# Patient Record
Sex: MALE | Race: WHITE | Employment: UNEMPLOYED | URBAN - METROPOLITAN AREA
[De-identification: names, ages, dates, MRNs, and addresses within clinical notes are randomized per-mention and may not be internally consistent; named-entity substitution may affect disease eponyms.]

---
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
## 2024-05-06 ENCOUNTER — OFFICE VISIT (OUTPATIENT)
Age: 19
End: 2024-05-06
Payer: COMMERCIAL

## 2024-05-06 VITALS
RESPIRATION RATE: 16 BRPM | SYSTOLIC BLOOD PRESSURE: 117 MMHG | TEMPERATURE: 97.9 F | OXYGEN SATURATION: 97 % | HEIGHT: 74 IN | WEIGHT: 194 LBS | HEART RATE: 68 BPM | BODY MASS INDEX: 24.9 KG/M2 | DIASTOLIC BLOOD PRESSURE: 75 MMHG

## 2024-05-06 DIAGNOSIS — R53.83 OTHER FATIGUE: ICD-10-CM

## 2024-05-06 DIAGNOSIS — J39.9 UPPER RESPIRATORY DISEASE: ICD-10-CM

## 2024-05-06 DIAGNOSIS — Z11.3 SCREENING FOR STD (SEXUALLY TRANSMITTED DISEASE): ICD-10-CM

## 2024-05-06 DIAGNOSIS — Z83.49 FAMILY HISTORY OF ALPHA 1 ANTITRYPSIN DEFICIENCY: ICD-10-CM

## 2024-05-06 DIAGNOSIS — J06.9 UPPER RESPIRATORY TRACT INFECTION, UNSPECIFIED TYPE: ICD-10-CM

## 2024-05-06 DIAGNOSIS — Z00.00 ENCOUNTER FOR MEDICAL EXAMINATION TO ESTABLISH CARE: Primary | ICD-10-CM

## 2024-05-06 LAB
ALBUMIN SERPL-MCNC: 4.2 G/DL (ref 3.5–5)
ALBUMIN/GLOB SERPL: 1 (ref 1.1–2.2)
ALP SERPL-CCNC: 47 U/L (ref 45–117)
ALT SERPL-CCNC: 25 U/L (ref 12–78)
ANION GAP SERPL CALC-SCNC: 3 MMOL/L (ref 5–15)
AST SERPL-CCNC: 15 U/L (ref 15–37)
BASOPHILS # BLD: 0 K/UL (ref 0–0.1)
BASOPHILS NFR BLD: 1 % (ref 0–1)
BILIRUB SERPL-MCNC: 0.9 MG/DL (ref 0.2–1)
BUN SERPL-MCNC: 14 MG/DL (ref 6–20)
BUN/CREAT SERPL: 12 (ref 12–20)
CALCIUM SERPL-MCNC: 9.8 MG/DL (ref 8.5–10.1)
CHLORIDE SERPL-SCNC: 105 MMOL/L (ref 97–108)
CHOLEST SERPL-MCNC: 178 MG/DL
CO2 SERPL-SCNC: 30 MMOL/L (ref 21–32)
CREAT SERPL-MCNC: 1.18 MG/DL (ref 0.7–1.3)
DIFFERENTIAL METHOD BLD: ABNORMAL
EOSINOPHIL # BLD: 0.1 K/UL (ref 0–0.4)
EOSINOPHIL NFR BLD: 2 % (ref 0–7)
ERYTHROCYTE [DISTWIDTH] IN BLOOD BY AUTOMATED COUNT: 11.5 % (ref 11.5–14.5)
GLOBULIN SER CALC-MCNC: 4.1 G/DL (ref 2–4)
GLUCOSE SERPL-MCNC: 95 MG/DL (ref 65–100)
HCT VFR BLD AUTO: 43.4 % (ref 36.6–50.3)
HDLC SERPL-MCNC: 35 MG/DL
HDLC SERPL: 5.1 (ref 0–5)
HGB BLD-MCNC: 15.4 G/DL (ref 12.1–17)
HIV 1+2 AB+HIV1 P24 AG SERPL QL IA: NONREACTIVE
HIV 1/2 RESULT COMMENT: NORMAL
IMM GRANULOCYTES # BLD AUTO: 0.1 K/UL (ref 0–0.04)
IMM GRANULOCYTES NFR BLD AUTO: 1 % (ref 0–0.5)
LDLC SERPL CALC-MCNC: 124 MG/DL (ref 0–100)
LYMPHOCYTES # BLD: 1.8 K/UL (ref 0.8–3.5)
LYMPHOCYTES NFR BLD: 33 % (ref 12–49)
MCH RBC QN AUTO: 31.2 PG (ref 26–34)
MCHC RBC AUTO-ENTMCNC: 35.5 G/DL (ref 30–36.5)
MCV RBC AUTO: 88 FL (ref 80–99)
MONOCYTES # BLD: 0.5 K/UL (ref 0–1)
MONOCYTES NFR BLD: 10 % (ref 5–13)
NEUTS SEG # BLD: 2.9 K/UL (ref 1.8–8)
NEUTS SEG NFR BLD: 53 % (ref 32–75)
NRBC # BLD: 0 K/UL (ref 0–0.01)
NRBC BLD-RTO: 0 PER 100 WBC
PLATELET # BLD AUTO: 268 K/UL (ref 150–400)
PMV BLD AUTO: 11.2 FL (ref 8.9–12.9)
POTASSIUM SERPL-SCNC: 4.4 MMOL/L (ref 3.5–5.1)
PROT SERPL-MCNC: 8.3 G/DL (ref 6.4–8.2)
RBC # BLD AUTO: 4.93 M/UL (ref 4.1–5.7)
RPR SER QL: NONREACTIVE
SODIUM SERPL-SCNC: 138 MMOL/L (ref 136–145)
TRIGL SERPL-MCNC: 95 MG/DL
TSH SERPL DL<=0.05 MIU/L-ACNC: 4.4 UIU/ML (ref 0.36–3.74)
VLDLC SERPL CALC-MCNC: 19 MG/DL
WBC # BLD AUTO: 5.4 K/UL (ref 4.1–11.1)

## 2024-05-06 PROCEDURE — 99204 OFFICE O/P NEW MOD 45 MIN: CPT | Performed by: INTERNAL MEDICINE

## 2024-05-06 RX ORDER — FEXOFENADINE HCL 180 MG/1
180 TABLET ORAL DAILY
COMMUNITY

## 2024-05-06 SDOH — ECONOMIC STABILITY: HOUSING INSECURITY
IN THE LAST 12 MONTHS, WAS THERE A TIME WHEN YOU DID NOT HAVE A STEADY PLACE TO SLEEP OR SLEPT IN A SHELTER (INCLUDING NOW)?: NO

## 2024-05-06 SDOH — ECONOMIC STABILITY: FOOD INSECURITY: WITHIN THE PAST 12 MONTHS, THE FOOD YOU BOUGHT JUST DIDN'T LAST AND YOU DIDN'T HAVE MONEY TO GET MORE.: NEVER TRUE

## 2024-05-06 SDOH — ECONOMIC STABILITY: INCOME INSECURITY: HOW HARD IS IT FOR YOU TO PAY FOR THE VERY BASICS LIKE FOOD, HOUSING, MEDICAL CARE, AND HEATING?: NOT HARD AT ALL

## 2024-05-06 SDOH — ECONOMIC STABILITY: FOOD INSECURITY: WITHIN THE PAST 12 MONTHS, YOU WORRIED THAT YOUR FOOD WOULD RUN OUT BEFORE YOU GOT MONEY TO BUY MORE.: NEVER TRUE

## 2024-05-06 ASSESSMENT — ENCOUNTER SYMPTOMS
CONSTIPATION: 0
SHORTNESS OF BREATH: 0
EYE PAIN: 0
ABDOMINAL PAIN: 0
SINUS PAIN: 1
CHEST TIGHTNESS: 0
COUGH: 1
SINUS PRESSURE: 1
DIARRHEA: 0
FACIAL SWELLING: 0
ABDOMINAL DISTENTION: 0
WHEEZING: 0
COLOR CHANGE: 0
APNEA: 0
CHOKING: 0
BACK PAIN: 0
PHOTOPHOBIA: 0
ANAL BLEEDING: 0

## 2024-05-06 ASSESSMENT — PATIENT HEALTH QUESTIONNAIRE - PHQ9
2. FEELING DOWN, DEPRESSED OR HOPELESS: SEVERAL DAYS
SUM OF ALL RESPONSES TO PHQ QUESTIONS 1-9: 1
1. LITTLE INTEREST OR PLEASURE IN DOING THINGS: NOT AT ALL
SUM OF ALL RESPONSES TO PHQ QUESTIONS 1-9: 1
SUM OF ALL RESPONSES TO PHQ9 QUESTIONS 1 & 2: 1

## 2024-05-06 NOTE — PROGRESS NOTES
Jim Cosme is a 18 y.o. male who presents today for New Patient  .      He has a history of There is no problem list on file for this patient.  .    Today patient is here to establish care.   Previous PCP Peds. he is switching because is an adult.  Records are not available for me to review.     Had + for flu, strep and bronchitis in late Jan. Still having pretty severe fatigue.  Going on throughout the day.  Having + brain fog. Still having cough and congestion.  Shortness of breath with exertion. Was playing lacrosse and had to stop due to fatigue.   10 lbs down.   Denies any nausea or vomiting.  Denies any changes in bowel habits or urinary frequency.  Denies that his mental health is deteriorating.  Denies any alcohol or drug use.    Family history of Alpha 1 antitrypsin deficiency: Her aunt does have liver disease from this.  Her mother is also positive phenotypically.     Social: At school at [employer]. First year.  Completed semester.    Was living in a dorm.  Vaping regularly. No EtOH.     Sexually active in the past.  Not currently sexually active.  With women.    Patient has had issues with drugs in the past.  Has had [facility] rehab several times.  Most recently when in high school went to [place].  Reports that he has been clean since then.  Reports that he does have good support.  Believes his mental health is relatively stable.    Family History: reviewed, mother with alpha-1 antitrypsin deficiency.  Aunt had to have liver transplant.    ROS  Review of Systems   Constitutional:  Positive for fatigue. Negative for activity change, appetite change and fever.   HENT:  Positive for congestion, sinus pressure and sinus pain. Negative for dental problem, ear discharge, ear pain and facial swelling.    Eyes:  Negative for photophobia and pain.   Respiratory:  Positive for cough. Negative for apnea, choking, chest tightness, shortness of breath and wheezing.    Cardiovascular:  Negative for chest pain.

## 2024-05-07 RX ORDER — PREDNISONE 20 MG/1
TABLET ORAL
Qty: 18 TABLET | Refills: 0 | Status: SHIPPED | OUTPATIENT
Start: 2024-05-07

## 2024-05-08 LAB
EBV INTERPRETATION: ABNORMAL
EBV NA IGG SER-ACNC: 102 U/ML (ref 0–17.9)
EBV VCA IGG SER-ACNC: 68.5 U/ML (ref 0–17.9)
EBV VCA IGM SER-ACNC: <36 U/ML (ref 0–35.9)

## 2024-05-14 LAB
A1AT PHENOTYP SERPL IFE: NORMAL
A1AT SERPL-MCNC: 115 MG/DL (ref 95–164)

## 2024-05-19 DIAGNOSIS — R51.9 NEW ONSET HEADACHE: Primary | ICD-10-CM

## 2024-05-19 DIAGNOSIS — R53.83 OTHER FATIGUE: ICD-10-CM

## 2024-05-19 DIAGNOSIS — R41.0 CONFUSION: ICD-10-CM

## 2024-05-29 ENCOUNTER — HOSPITAL ENCOUNTER (OUTPATIENT)
Facility: HOSPITAL | Age: 19
Discharge: HOME | End: 2024-06-01
Attending: INTERNAL MEDICINE
Payer: COMMERCIAL

## 2024-05-29 DIAGNOSIS — R41.0 CONFUSION: ICD-10-CM

## 2024-05-29 DIAGNOSIS — R53.83 OTHER FATIGUE: ICD-10-CM

## 2024-05-29 DIAGNOSIS — R51.9 NEW ONSET HEADACHE: ICD-10-CM

## 2024-05-29 PROCEDURE — 70450 CT HEAD/BRAIN W/O DYE: CPT

## 2024-06-13 ENCOUNTER — OFFICE VISIT (OUTPATIENT)
Age: 19
End: 2024-06-13
Payer: COMMERCIAL

## 2024-06-13 VITALS
HEIGHT: 74 IN | DIASTOLIC BLOOD PRESSURE: 73 MMHG | HEART RATE: 73 BPM | TEMPERATURE: 97.9 F | RESPIRATION RATE: 16 BRPM | BODY MASS INDEX: 24.64 KG/M2 | OXYGEN SATURATION: 97 % | SYSTOLIC BLOOD PRESSURE: 113 MMHG | WEIGHT: 192 LBS

## 2024-06-13 DIAGNOSIS — R06.09 DOE (DYSPNEA ON EXERTION): ICD-10-CM

## 2024-06-13 DIAGNOSIS — R53.83 OTHER FATIGUE: ICD-10-CM

## 2024-06-13 DIAGNOSIS — J32.9 SINUSITIS, UNSPECIFIED CHRONICITY, UNSPECIFIED LOCATION: Primary | ICD-10-CM

## 2024-06-13 DIAGNOSIS — R07.9 CHEST PAIN, UNSPECIFIED TYPE: ICD-10-CM

## 2024-06-13 DIAGNOSIS — R05.9 COUGH, UNSPECIFIED TYPE: ICD-10-CM

## 2024-06-13 PROCEDURE — 99214 OFFICE O/P EST MOD 30 MIN: CPT | Performed by: INTERNAL MEDICINE

## 2024-06-13 RX ORDER — DOXYCYCLINE HYCLATE 100 MG
100 TABLET ORAL 2 TIMES DAILY
Qty: 14 TABLET | Refills: 0 | Status: SHIPPED | OUTPATIENT
Start: 2024-06-13 | End: 2024-06-20

## 2024-06-13 ASSESSMENT — ENCOUNTER SYMPTOMS
BACK PAIN: 0
RHINORRHEA: 0
SHORTNESS OF BREATH: 1
SINUS PRESSURE: 0
STRIDOR: 0
WHEEZING: 0
ABDOMINAL PAIN: 0
DIARRHEA: 0
COUGH: 1
FACIAL SWELLING: 0
CONSTIPATION: 0
SINUS PAIN: 0

## 2024-06-13 NOTE — PROGRESS NOTES
fexofenadine (ALLEGRA) 180 MG tablet Take 1 tablet by mouth daily    predniSONE (DELTASONE) 20 MG tablet Take 3 tablets by mouth daily for 3 days followed by 2 tablets for 3 days followed by 1 tablet for 3 days then stop. (Patient not taking: Reported on 6/13/2024)     No current facility-administered medications for this visit.        Past Medical History:   Diagnosis Date    ADHD       No past surgical history on file.   Social History     Tobacco Use    Smoking status: Never    Smokeless tobacco: Never   Substance Use Topics    Alcohol use: Never      No family history on file.     No Known Allergies     Assessment/Plan  Jim MCDANIEL was seen today for fatigue.    Diagnoses and all orders for this visit:    Sinusitis, unspecified chronicity, unspecified location  -     doxycycline hyclate (VIBRA-TABS) 100 MG tablet; Take 1 tablet by mouth 2 times daily for 7 days    Other fatigue-patient with persistent and pretty significant generalized symptoms.  Though he does not appear volume overloaded, given viral illness in January we will check an echocardiogram.  Will empirically treat him for sinus infection given his drainage and congestion.  We discussed that unlikely that his sinus infection is causing the degree of his symptoms.  He does not believe that his mental health is causing his physical symptoms.  He is under some stress given his father's recent diagnosis.  We discussed that if the antibiotics does not help and the echo does not lead to any findings, will consider home sleep study.  He will work on setting his circadian rhythm and his sleep-wake cycle.  Could also consider empirically treating him for depression down the road if we do not find other answers.  Workup thus far has been negative.     HAWTHORNE (dyspnea on exertion)  -     doxycycline hyclate (VIBRA-TABS) 100 MG tablet; Take 1 tablet by mouth 2 times daily for 7 days  -     Echo (TTE) complete (PRN contrast/bubble/strain/3D); Future    Cough,

## 2024-06-28 ENCOUNTER — HOSPITAL ENCOUNTER (OUTPATIENT)
Facility: HOSPITAL | Age: 19
Discharge: HOME | End: 2024-06-28
Attending: INTERNAL MEDICINE
Payer: COMMERCIAL

## 2024-06-28 VITALS — BODY MASS INDEX: 24.64 KG/M2 | WEIGHT: 192.02 LBS | HEIGHT: 74 IN

## 2024-06-28 DIAGNOSIS — R05.9 COUGH, UNSPECIFIED TYPE: ICD-10-CM

## 2024-06-28 DIAGNOSIS — R06.09 DOE (DYSPNEA ON EXERTION): ICD-10-CM

## 2024-06-28 DIAGNOSIS — R07.9 CHEST PAIN, UNSPECIFIED TYPE: ICD-10-CM

## 2024-06-28 PROCEDURE — 93306 TTE W/DOPPLER COMPLETE: CPT

## 2024-06-29 LAB
ECHO AO ROOT DIAM: 3 CM
ECHO AO ROOT INDEX: 1.4 CM/M2
ECHO AV AREA PEAK VELOCITY: 3.3 CM2
ECHO AV AREA/BSA PEAK VELOCITY: 1.5 CM2/M2
ECHO AV PEAK GRADIENT: 4 MMHG
ECHO AV PEAK VELOCITY: 1 M/S
ECHO AV VELOCITY RATIO: 0.8
ECHO BSA: 2.13 M2
ECHO EST RA PRESSURE: 3 MMHG
ECHO LA DIAMETER INDEX: 1.31 CM/M2
ECHO LA DIAMETER: 2.8 CM
ECHO LA TO AORTIC ROOT RATIO: 0.93
ECHO LV E' LATERAL VELOCITY: 19 CM/S
ECHO LV E' SEPTAL VELOCITY: 14 CM/S
ECHO LV FRACTIONAL SHORTENING: 35 % (ref 28–44)
ECHO LV INTERNAL DIMENSION DIASTOLE INDEX: 2.52 CM/M2
ECHO LV INTERNAL DIMENSION DIASTOLIC: 5.4 CM (ref 4.2–5.9)
ECHO LV INTERNAL DIMENSION SYSTOLIC INDEX: 1.64 CM/M2
ECHO LV INTERNAL DIMENSION SYSTOLIC: 3.5 CM
ECHO LV IVSD: 0.9 CM (ref 0.6–1)
ECHO LV MASS 2D: 180.1 G (ref 88–224)
ECHO LV MASS INDEX 2D: 84.2 G/M2 (ref 49–115)
ECHO LV POSTERIOR WALL DIASTOLIC: 0.9 CM (ref 0.6–1)
ECHO LV RELATIVE WALL THICKNESS RATIO: 0.33
ECHO LVOT AREA: 4.2 CM2
ECHO LVOT DIAM: 2.3 CM
ECHO LVOT PEAK GRADIENT: 3 MMHG
ECHO LVOT PEAK VELOCITY: 0.8 M/S
ECHO MV A VELOCITY: 0.42 M/S
ECHO MV E VELOCITY: 0.67 M/S
ECHO MV E/A RATIO: 1.6
ECHO MV E/E' LATERAL: 3.53
ECHO MV E/E' RATIO (AVERAGED): 4.16
ECHO MV E/E' SEPTAL: 4.79
ECHO PULMONARY ARTERY END DIASTOLIC PRESSURE: 9 MMHG
ECHO PV MAX VELOCITY: 1.1 M/S
ECHO PV PEAK GRADIENT: 5 MMHG
ECHO RIGHT VENTRICULAR SYSTOLIC PRESSURE (RVSP): 6 MMHG
ECHO RV FREE WALL PEAK S': 17 CM/S
ECHO RV TAPSE: 1.7 CM (ref 1.7–?)
ECHO TV REGURGITANT MAX VELOCITY: 0.81 M/S
ECHO TV REGURGITANT PEAK GRADIENT: 3 MMHG

## 2024-07-01 RX ORDER — BUPROPION HYDROCHLORIDE 150 MG/1
150 TABLET ORAL EVERY MORNING
Qty: 30 TABLET | Refills: 1 | Status: SHIPPED | OUTPATIENT
Start: 2024-07-01

## 2024-07-31 ENCOUNTER — OFFICE VISIT (OUTPATIENT)
Age: 19
End: 2024-07-31

## 2024-07-31 VITALS
HEART RATE: 82 BPM | OXYGEN SATURATION: 98 % | TEMPERATURE: 96.8 F | SYSTOLIC BLOOD PRESSURE: 104 MMHG | WEIGHT: 185.8 LBS | DIASTOLIC BLOOD PRESSURE: 64 MMHG | BODY MASS INDEX: 23.1 KG/M2 | HEIGHT: 75 IN

## 2024-07-31 DIAGNOSIS — A64 STD (MALE): Primary | ICD-10-CM

## 2024-07-31 DIAGNOSIS — R53.83 FATIGUE, UNSPECIFIED TYPE: ICD-10-CM

## 2024-07-31 LAB — HETEROPHILE ANTIBODIES: NEGATIVE

## 2024-07-31 NOTE — PROGRESS NOTES
Jim Cosme (:  2005) is a 18 y.o. male,New patient, here for evaluation of the following chief complaint(s):  Fatigue (Had mono, fatigue, headache, body aches, two small bumps on penis unsure of cause)      Assessment & Plan :  Below is the assessment and plan developed based on review of history, physical exam, and  labs.    1. STD (male)  - Ct Ng Tv HSV by CIPRIANO; Future  - Ct Ng Tv HSV by CIPRIANO    2. Fatigue, unspecified type  Patient appears moderately ill, VSS, afebrile, SPO2 98% on room air. No distress noted. Mono negative. Patient had EBV 6 months ago and has not recovered. Patient advised to drink plenty of fluids and rest. Advised to follow-up with PCP or ER if symptoms fail to improve or worsens. Patient verbalized understanding, no questions or concerns at this time.    - POCT Infectious mononucleosis Abs (mono)  Results for orders placed or performed in visit on 24   POCT Infectious mononucleosis Abs (mono)   Result Value Ref Range    Monospot Negative       1. Handout given with care instructions.     2. OTC for symptom management. Increase fluid intake.     3. Follow-up with PCP as needed.     4. Go to ED with development of any acute symptoms.         Follow-up    Follow-up with PCP or ER immediately for any new worsening or changes or if symptoms are not improving over the next 5-7 days.        Subjective :  HPI     18 y.o. male presents with symptoms of fatigue, congestion, weakness, and headaches x 6 months. Patient has EBV and has a history of mononucleosis. Patient complains of pimples on penis x 3 months. Patient states he has not been sexually active months. Patient states he Vaps.     Vitals:    24 1647   BP: 104/64   Site: Left Upper Arm   Position: Sitting   Cuff Size: Large Adult   Pulse: 82   Temp: 96.8 °F (36 °C)   TempSrc: Oral   SpO2: 98%   Weight: 84.3 kg (185 lb 12.8 oz)   Height: 1.905 m (6' 3\")       No results found for this visit on 24.      Objective

## 2024-07-31 NOTE — PATIENT INSTRUCTIONS
If symptoms worsens or fail to improve follow-up with PCP or ER.    Thank you for visiting [facility] today    Nasal Congestion:  Flonase or Nasonex (over the counter) nasal spray, once a day  Saline irrigation kits help wash out sinuses 1-2 times a day  Normal saline nasal spray  Cough:  Throat lozenges, hot tea, and honey may help  Vicks VapoRub at night to help with cough and relieve muscles aches and pain    Congestion:  For thick mucus, take Mucinex (with Guafenesin only) to help thin the mucus.  Follow instructions on the box.  You will need to drink plenty of water with this medication.  Sore Throat:  Lozenges, as needed. Cepacol lozenges will help numb the throat  Chloraseptic spray also helps to numb throat pain  Salt water gargles to soothe throat pain  Sinus pain/pressure:  Warm, wet towel on face to help with facial sinus pain/pressure  Headache/Pain Fever/Body Aches:  If you can take NSAIDs, take Ibuprofen 400-800mg every 8 hours as needed  If you cannot take NSAIDs, take Tylenol 325-500mg every 6 hours as needed  Miscellanous:    Simple foods like chicken noodle soup, smoothies, hot tea with lemon and honey may also help  Avoid smoking  Minimize exposure to irritants    Please follow up with your primary care provider within 2-5 days if your signs and symptoms have not resolved or worsened.    Please go immediately to the Emergency Department if you develop shortness of breath, chest pain and uncontrollable fever above 100.4F.

## 2024-08-04 LAB
C TRACH RRNA SPEC QL NAA+PROBE: NEGATIVE
HSV1 DNA SPEC QL NAA+PROBE: NEGATIVE
HSV2 DNA SPEC QL NAA+PROBE: NEGATIVE
N GONORRHOEA RRNA SPEC QL NAA+PROBE: NEGATIVE
SPECIMEN SOURCE: NORMAL
T VAGINALIS RRNA SPEC QL NAA+PROBE: NEGATIVE

## 2024-08-05 ENCOUNTER — OFFICE VISIT (OUTPATIENT)
Age: 19
End: 2024-08-05
Payer: COMMERCIAL

## 2024-08-05 VITALS
HEIGHT: 74 IN | RESPIRATION RATE: 16 BRPM | DIASTOLIC BLOOD PRESSURE: 76 MMHG | WEIGHT: 185 LBS | OXYGEN SATURATION: 98 % | SYSTOLIC BLOOD PRESSURE: 111 MMHG | BODY MASS INDEX: 23.74 KG/M2 | TEMPERATURE: 98.1 F | HEART RATE: 75 BPM

## 2024-08-05 DIAGNOSIS — R07.9 CHEST PAIN, UNSPECIFIED TYPE: ICD-10-CM

## 2024-08-05 DIAGNOSIS — H93.8X3 CONGESTION OF BOTH EARS: ICD-10-CM

## 2024-08-05 DIAGNOSIS — R53.83 OTHER FATIGUE: Primary | ICD-10-CM

## 2024-08-05 DIAGNOSIS — R06.09 DOE (DYSPNEA ON EXERTION): ICD-10-CM

## 2024-08-05 DIAGNOSIS — R53.83 OTHER FATIGUE: ICD-10-CM

## 2024-08-05 DIAGNOSIS — R45.84 ANHEDONIA: ICD-10-CM

## 2024-08-05 PROCEDURE — 99214 OFFICE O/P EST MOD 30 MIN: CPT | Performed by: INTERNAL MEDICINE

## 2024-08-05 RX ORDER — BUPROPION HYDROCHLORIDE 300 MG/1
300 TABLET ORAL EVERY MORNING
Qty: 90 TABLET | Refills: 1 | Status: SHIPPED | OUTPATIENT
Start: 2024-08-05

## 2024-08-05 RX ORDER — FLUTICASONE PROPIONATE 50 MCG
2 SPRAY, SUSPENSION (ML) NASAL DAILY
Qty: 48 G | Refills: 1 | Status: SHIPPED | OUTPATIENT
Start: 2024-08-05

## 2024-08-05 ASSESSMENT — ENCOUNTER SYMPTOMS
SHORTNESS OF BREATH: 1
DIARRHEA: 0
WHEEZING: 0
BACK PAIN: 0
CONSTIPATION: 0
ABDOMINAL PAIN: 0

## 2024-08-05 NOTE — PROGRESS NOTES
Jim Cosme is a 18 y.o. male who presents today for Fatigue (1 mo f/u; pt states he is still feeling fatigue; per pt states symptoms have improved but still present )  .      He has a history of There is no problem list on file for this patient.  .    Today patient is here for follow-up.     Fatigue/headaches: Patient continues to experience pretty significant fatigue headaches and coughing.  This seems to have all started after a bout of an upper respiratory infection in late January.  At that time he was diagnosed with COVID and flu as well as mono..    This is led to decreased exercise tolerance to the point where he stopped playing lacrosse.  He was able to complete his semester..  We did do a series of blood testing including thyroid, STD screening, blood counts, organ function which were all within normal limits.  TSH was marginally elevated.  CT scan of the head was negative for any acute findings.  Last visit we discussed checking an echocardiogram which was within normal limits.  We also did start him on Wellbutrin in late June.  Overall he believes that he is feeling a bit better.  He is able to do some physical activity but then often times feels tired.  Still feels winded at times.  Still having some mucus production.  Headaches seem to be a bit better..  His family has looked up information on chronic fatigue syndrome and he does have appointment in [place], but not until Dec. .   We discussed checking a couple additional labs including cortisol levels and repeating TSH.   Would also suggest doing a sleep study.    ROS  Review of Systems   Constitutional:  Positive for fatigue. Negative for activity change, appetite change and fever.   HENT:  Positive for congestion.    Respiratory:  Positive for shortness of breath. Negative for wheezing.    Cardiovascular:  Negative for chest pain.   Gastrointestinal:  Negative for abdominal pain, constipation and diarrhea.   Musculoskeletal:  Negative for back pain and

## 2024-08-06 LAB
ALBUMIN SERPL-MCNC: 4.7 G/DL (ref 3.5–5)
ALBUMIN/GLOB SERPL: 1.3 (ref 1.1–2.2)
ALP SERPL-CCNC: 43 U/L (ref 45–117)
ALT SERPL-CCNC: 41 U/L (ref 12–78)
ANION GAP SERPL CALC-SCNC: 5 MMOL/L (ref 5–15)
AST SERPL-CCNC: 44 U/L (ref 15–37)
BASOPHILS # BLD: 0 K/UL (ref 0–0.1)
BASOPHILS NFR BLD: 1 % (ref 0–1)
BILIRUB SERPL-MCNC: 0.7 MG/DL (ref 0.2–1)
BUN SERPL-MCNC: 12 MG/DL (ref 6–20)
BUN/CREAT SERPL: 10 (ref 12–20)
CALCIUM SERPL-MCNC: 10 MG/DL (ref 8.5–10.1)
CHLORIDE SERPL-SCNC: 104 MMOL/L (ref 97–108)
CO2 SERPL-SCNC: 29 MMOL/L (ref 21–32)
CORTIS AM PEAK SERPL-MCNC: 9.5 UG/DL (ref 4.3–22.45)
CREAT SERPL-MCNC: 1.18 MG/DL (ref 0.7–1.3)
DIFFERENTIAL METHOD BLD: ABNORMAL
EOSINOPHIL # BLD: 0.1 K/UL (ref 0–0.4)
EOSINOPHIL NFR BLD: 2 % (ref 0–7)
ERYTHROCYTE [DISTWIDTH] IN BLOOD BY AUTOMATED COUNT: 11.9 % (ref 11.5–14.5)
GLOBULIN SER CALC-MCNC: 3.7 G/DL (ref 2–4)
GLUCOSE SERPL-MCNC: 88 MG/DL (ref 65–100)
HCT VFR BLD AUTO: 46.4 % (ref 36.6–50.3)
HGB BLD-MCNC: 15.7 G/DL (ref 12.1–17)
IMM GRANULOCYTES # BLD AUTO: 0 K/UL (ref 0–0.04)
IMM GRANULOCYTES NFR BLD AUTO: 0 % (ref 0–0.5)
LYMPHOCYTES # BLD: 1.2 K/UL (ref 0.8–3.5)
LYMPHOCYTES NFR BLD: 33 % (ref 12–49)
MCH RBC QN AUTO: 30.8 PG (ref 26–34)
MCHC RBC AUTO-ENTMCNC: 33.8 G/DL (ref 30–36.5)
MCV RBC AUTO: 91 FL (ref 80–99)
MONOCYTES # BLD: 0.4 K/UL (ref 0–1)
MONOCYTES NFR BLD: 10 % (ref 5–13)
NEUTS SEG # BLD: 2 K/UL (ref 1.8–8)
NEUTS SEG NFR BLD: 54 % (ref 32–75)
NRBC # BLD: 0 K/UL (ref 0–0.01)
NRBC BLD-RTO: 0 PER 100 WBC
PLATELET # BLD AUTO: 260 K/UL (ref 150–400)
PMV BLD AUTO: 10.9 FL (ref 8.9–12.9)
POTASSIUM SERPL-SCNC: 4.6 MMOL/L (ref 3.5–5.1)
PROT SERPL-MCNC: 8.4 G/DL (ref 6.4–8.2)
RBC # BLD AUTO: 5.1 M/UL (ref 4.1–5.7)
SODIUM SERPL-SCNC: 138 MMOL/L (ref 136–145)
T4 FREE SERPL-MCNC: 1 NG/DL (ref 0.8–1.5)
TSH SERPL DL<=0.05 MIU/L-ACNC: 2.04 UIU/ML (ref 0.36–3.74)
WBC # BLD AUTO: 3.6 K/UL (ref 4.1–11.1)

## 2024-08-07 ENCOUNTER — TELEPHONE (OUTPATIENT)
Age: 19
End: 2024-08-07

## 2024-08-07 LAB — LYME ANTIBODY: NEGATIVE

## 2024-08-07 NOTE — TELEPHONE ENCOUNTER
----- Message from Anaid Rivera sent at 8/7/2024 12:40 PM EDT -----  Regarding: E[facility] Message to Provider  E[facility] Message to Provider    Relationship to Patient: Covered Entity      Additional Information Covered entity calling from the sleep test, asking to send the visit office notes because the insurance needs it so they can schedule the patient for the home sleep test.  --------------------------------------------------------------------------------------------------------------------------    Call Back Information: OK to leave message on voicemail  Preferred Call Back Number: Phone [phone] extension [phone]    Fax number: [phone]

## 2024-08-09 LAB
GAMMA INTERFERON BACKGROUND BLD IA-ACNC: 0 IU/ML
M TB IFN-G BLD-IMP: NEGATIVE
M TB IFN-G CD4+ BCKGRND COR BLD-ACNC: 0.01 IU/ML
M TB IFN-G CD4+CD8+ BCKGRND COR BLD-ACNC: 0 IU/ML
MITOGEN IGNF BCKGRD COR BLD-ACNC: >10 IU/ML
SERVICE CMNT-IMP: NORMAL

## 2025-01-08 ENCOUNTER — OFFICE VISIT (OUTPATIENT)
Facility: CLINIC | Age: 20
End: 2025-01-08
Payer: COMMERCIAL

## 2025-01-08 VITALS
OXYGEN SATURATION: 99 % | HEIGHT: 74 IN | SYSTOLIC BLOOD PRESSURE: 114 MMHG | TEMPERATURE: 98.4 F | BODY MASS INDEX: 23.87 KG/M2 | WEIGHT: 186 LBS | HEART RATE: 106 BPM | RESPIRATION RATE: 16 BRPM | DIASTOLIC BLOOD PRESSURE: 74 MMHG

## 2025-01-08 DIAGNOSIS — T78.40XA ALLERGIC REACTION, INITIAL ENCOUNTER: ICD-10-CM

## 2025-01-08 DIAGNOSIS — J30.89 NON-SEASONAL ALLERGIC RHINITIS, UNSPECIFIED TRIGGER: ICD-10-CM

## 2025-01-08 DIAGNOSIS — H61.21 IMPACTED CERUMEN OF RIGHT EAR: Primary | ICD-10-CM

## 2025-01-08 PROCEDURE — 99213 OFFICE O/P EST LOW 20 MIN: CPT | Performed by: NURSE PRACTITIONER

## 2025-01-08 ASSESSMENT — ENCOUNTER SYMPTOMS
EYE PAIN: 0
CONSTIPATION: 0
COUGH: 0
ABDOMINAL PAIN: 0
VOMITING: 0
SORE THROAT: 0
NAUSEA: 0
BACK PAIN: 0
SHORTNESS OF BREATH: 0
RESPIRATORY NEGATIVE: 1
GASTROINTESTINAL NEGATIVE: 1
SINUS PAIN: 0
EYE REDNESS: 0
BLOOD IN STOOL: 0
RHINORRHEA: 0
CHEST TIGHTNESS: 0
EYES NEGATIVE: 1
DIARRHEA: 0
SINUS PRESSURE: 1

## 2025-01-08 NOTE — PROGRESS NOTES
Negative.  Negative for cough, chest tightness and shortness of breath.    Cardiovascular: Negative.  Negative for chest pain and palpitations.   Gastrointestinal: Negative.  Negative for abdominal pain, blood in stool, constipation, diarrhea, nausea and vomiting.   Endocrine: Negative.  Negative for polydipsia, polyphagia and polyuria.   Genitourinary: Negative.  Negative for difficulty urinating, dysuria, frequency and urgency.   Musculoskeletal: Negative.  Negative for back pain.   Skin:  Positive for rash. Negative for wound.   Neurological: Negative.  Negative for dizziness, light-headedness and headaches.   Psychiatric/Behavioral: Negative.  Negative for agitation, behavioral problems, sleep disturbance and suicidal ideas. The patient is not nervous/anxious.      Past Medical History   No Known Allergies     Current Outpatient Medications   Medication Sig    fluticasone (FLONASE) 50 MCG/ACT nasal spray 2 sprays by Each Nostril route daily    buPROPion (WELLBUTRIN XL) 300 MG extended release tablet Take 1 tablet by mouth every morning    fexofenadine (ALLEGRA) 180 MG tablet Take 1 tablet by mouth daily     No current facility-administered medications for this visit.      There is no problem list on file for this patient.    History reviewed. No pertinent surgical history.   Social History     Tobacco Use    Smoking status: Every Day    Smokeless tobacco: Never   Substance Use Topics    Alcohol use: Never      History reviewed. No pertinent family history.     Physical Exam   Vitals:       /74 (Site: Left Upper Arm, Position: Sitting, Cuff Size: Small Adult)   Pulse (!) 106   Temp 98.4 °F (36.9 °C) (Oral)   Resp 16   Ht 1.88 m (6' 2\")   Wt 84.4 kg (186 lb)   SpO2 99%   BMI 23.88 kg/m²      Physical Exam  Vitals reviewed.   Constitutional:       Appearance: Normal appearance.   HENT:      Head: Normocephalic and atraumatic.      Right Ear: There is impacted cerumen.      Left Ear: Hearing, tympanic

## 2025-03-13 DIAGNOSIS — R45.84 ANHEDONIA: ICD-10-CM

## 2025-03-13 RX ORDER — BUPROPION HYDROCHLORIDE 300 MG/1
300 TABLET ORAL EVERY MORNING
Qty: 90 TABLET | Refills: 1 | Status: SHIPPED | OUTPATIENT
Start: 2025-03-13

## 2025-04-23 DIAGNOSIS — R45.84 ANHEDONIA: ICD-10-CM

## 2025-04-24 RX ORDER — BUPROPION HYDROCHLORIDE 300 MG/1
300 TABLET ORAL EVERY MORNING
Qty: 90 TABLET | Refills: 1 | Status: SHIPPED | OUTPATIENT
Start: 2025-04-24 | End: 2025-04-25 | Stop reason: SDUPTHER

## 2025-04-25 DIAGNOSIS — R45.84 ANHEDONIA: ICD-10-CM

## 2025-04-25 RX ORDER — BUPROPION HYDROCHLORIDE 300 MG/1
300 TABLET ORAL EVERY MORNING
Qty: 90 TABLET | Refills: 1 | Status: SHIPPED | OUTPATIENT
Start: 2025-04-25